# Patient Record
Sex: FEMALE | Race: BLACK OR AFRICAN AMERICAN | NOT HISPANIC OR LATINO | Employment: FULL TIME | URBAN - METROPOLITAN AREA
[De-identification: names, ages, dates, MRNs, and addresses within clinical notes are randomized per-mention and may not be internally consistent; named-entity substitution may affect disease eponyms.]

---

## 2023-08-10 LAB
CHLAMYDIA TRACH., AMPLIFIED: NEGATIVE
CLUE CELLS: PRESENT
N. GONORRHEA, AMPLIFIED: NEGATIVE
NUGENT SCORE: 9
TRICHOMONAS VAGINALIS: NEGATIVE
YEAST: ABNORMAL

## 2023-09-06 LAB — URINE CULTURE: NORMAL

## 2023-09-28 LAB
CLUE CELLS: ABNORMAL
NUGENT SCORE: 3
YEAST: PRESENT

## 2024-03-04 ENCOUNTER — HOSPITAL ENCOUNTER (EMERGENCY)
Facility: HOSPITAL | Age: 44
Discharge: HOME | End: 2024-03-04
Attending: STUDENT IN AN ORGANIZED HEALTH CARE EDUCATION/TRAINING PROGRAM
Payer: COMMERCIAL

## 2024-03-04 VITALS
WEIGHT: 165 LBS | DIASTOLIC BLOOD PRESSURE: 80 MMHG | HEIGHT: 62 IN | SYSTOLIC BLOOD PRESSURE: 124 MMHG | BODY MASS INDEX: 30.36 KG/M2 | RESPIRATION RATE: 16 BRPM | HEART RATE: 89 BPM | OXYGEN SATURATION: 100 % | TEMPERATURE: 98.3 F

## 2024-03-04 DIAGNOSIS — L02.91 ABSCESS: Primary | ICD-10-CM

## 2024-03-04 DIAGNOSIS — L73.2 HIDRADENITIS: ICD-10-CM

## 2024-03-04 PROCEDURE — 99283 EMERGENCY DEPT VISIT LOW MDM: CPT

## 2024-03-04 PROCEDURE — 99283 EMERGENCY DEPT VISIT LOW MDM: CPT | Performed by: STUDENT IN AN ORGANIZED HEALTH CARE EDUCATION/TRAINING PROGRAM

## 2024-03-04 RX ORDER — ACETAMINOPHEN 325 MG/1
650 TABLET ORAL ONCE
Status: DISCONTINUED | OUTPATIENT
Start: 2024-03-04 | End: 2024-03-04 | Stop reason: HOSPADM

## 2024-03-04 RX ORDER — ACETAMINOPHEN 500 MG
1000 TABLET ORAL EVERY 6 HOURS PRN
Qty: 30 TABLET | Refills: 0 | Status: SHIPPED | OUTPATIENT
Start: 2024-03-04 | End: 2024-03-14

## 2024-03-04 RX ORDER — CHLORHEXIDINE GLUCONATE 40 MG/ML
SOLUTION TOPICAL DAILY PRN
Qty: 473 ML | Refills: 0 | Status: SHIPPED | OUTPATIENT
Start: 2024-03-04 | End: 2024-03-04 | Stop reason: SDUPTHER

## 2024-03-04 RX ORDER — CLINDAMYCIN PHOSPHATE 11.9 MG/ML
SOLUTION TOPICAL 2 TIMES DAILY
Qty: 60 ML | Refills: 0 | Status: SHIPPED | OUTPATIENT
Start: 2024-03-04 | End: 2024-03-04 | Stop reason: SDUPTHER

## 2024-03-04 RX ORDER — CLINDAMYCIN PHOSPHATE 11.9 MG/ML
SOLUTION TOPICAL 2 TIMES DAILY
Qty: 60 ML | Refills: 0 | Status: SHIPPED | OUTPATIENT
Start: 2024-03-04 | End: 2025-03-04

## 2024-03-04 RX ORDER — CHLORHEXIDINE GLUCONATE 40 MG/ML
SOLUTION TOPICAL DAILY PRN
Qty: 473 ML | Refills: 0 | Status: SHIPPED | OUTPATIENT
Start: 2024-03-04

## 2024-03-04 RX ORDER — CLINDAMYCIN PHOSPHATE 11.9 MG/ML
SOLUTION TOPICAL ONCE
Status: DISCONTINUED | OUTPATIENT
Start: 2024-03-04 | End: 2024-03-04 | Stop reason: HOSPADM

## 2024-03-04 RX ORDER — ACETAMINOPHEN 500 MG
1000 TABLET ORAL EVERY 6 HOURS PRN
Qty: 30 TABLET | Refills: 0 | Status: SHIPPED | OUTPATIENT
Start: 2024-03-04 | End: 2024-03-04 | Stop reason: SDUPTHER

## 2024-03-04 ASSESSMENT — LIFESTYLE VARIABLES
HAVE YOU EVER FELT YOU SHOULD CUT DOWN ON YOUR DRINKING: NO
EVER FELT BAD OR GUILTY ABOUT YOUR DRINKING: NO
EVER HAD A DRINK FIRST THING IN THE MORNING TO STEADY YOUR NERVES TO GET RID OF A HANGOVER: NO
HAVE PEOPLE ANNOYED YOU BY CRITICIZING YOUR DRINKING: NO

## 2024-03-04 ASSESSMENT — PAIN - FUNCTIONAL ASSESSMENT: PAIN_FUNCTIONAL_ASSESSMENT: 0-10

## 2024-03-04 ASSESSMENT — PAIN DESCRIPTION - PAIN TYPE: TYPE: ACUTE PAIN

## 2024-03-04 ASSESSMENT — PAIN SCALES - GENERAL: PAINLEVEL_OUTOF10: 10 - WORST POSSIBLE PAIN

## 2024-03-04 ASSESSMENT — PAIN DESCRIPTION - LOCATION: LOCATION: BUTTOCKS

## 2024-03-04 ASSESSMENT — COLUMBIA-SUICIDE SEVERITY RATING SCALE - C-SSRS
6. HAVE YOU EVER DONE ANYTHING, STARTED TO DO ANYTHING, OR PREPARED TO DO ANYTHING TO END YOUR LIFE?: NO
1. IN THE PAST MONTH, HAVE YOU WISHED YOU WERE DEAD OR WISHED YOU COULD GO TO SLEEP AND NOT WAKE UP?: NO
2. HAVE YOU ACTUALLY HAD ANY THOUGHTS OF KILLING YOURSELF?: NO

## 2024-03-04 NOTE — Clinical Note
Fabiana Villanueva was seen and treated in our emergency department on 3/4/2024.  She may return to work on 03/05/2024.       If you have any questions or concerns, please don't hesitate to call.      Alfredito Fontana, DO

## 2024-03-04 NOTE — DISCHARGE INSTRUCTIONS
Please return to the ER for any worsening or persistent symptoms including high fevers, please make sure that you follow-up with general surgery and/or dermatology as needed.  You should use the topical antibiotics as prescribed.

## 2024-03-04 NOTE — ED PROVIDER NOTES
HPI: 43-year-old female with past medical history of hidradenitis suppurativa presents to the emergency department complaining of a abscess on her right medial buttock.  Patient endorses that it started yesterday, worsened pain today.  She denies any fevers, difficulty defecating, abdominal pain, nausea, vomiting or diarrhea.  States that she has had hidradenitis suppurativa flares in her buttocks, axilla bilaterally.  She has been doing warm compresses, has not taken any antibiotics or analgesia at home.    ED Triage Vitals [03/04/24 0118]   Temperature Heart Rate Respirations BP   36.8 °C (98.3 °F) 89 16 124/80      Pulse Ox Temp Source Heart Rate Source Patient Position   100 % Oral Monitor Sitting      BP Location FiO2 (%)     Right arm 21 %         Physical Examination  Vital signs reviewed in nursing triage note, on EMR flow sheets, and at bedside.  GEN: Well-developed, well nourished, in no acute distress.   SKIN: Warm, dry, intact. No gross rashes or lesions.   EYES: Pupils equal, round. EOM grossly intact. Conjunctiva clear.   HENT: Normocephalic, atraumatic, mucous membranes moist.   NECK: Supple, trachea midline.   CV: Regular rate.   PULM: Breathing nonlabored on room air, speaks in full sentences.   GI: Abd soft, nontender, nondistended.   : chaperone present.  Does have fluctuant erythematous area on right medial aspect of gluteal cleft, not near the anus.  EXT: Symmetric muscle bulk, moves all equally. No joint swelling, no pedal edema.   NEURO: Alert, CN II-XII grossly intact, no gross focal deficits. Ambulates with normal gait.   PSYCH: Answers questions appropriately with congruent affect.    MDM  43-year-old female with a past medical history of hidradenitis suppurativa presents to the emergency department complaining of abscess on her right medial buttock since yesterday.  Vital signs are stable, patient is nontoxic.  Exam is as above.  Not close to the anus, does not appear to be tracking as  such.  Patient has had at bedtime flares on her buttocks and in her axilla in the past.  We discussed importance of minimizing incision and drainages in the emergency department due to scarring and recommended outpatient follow-up with general surgery, dermatology.  She was prescribed topical clindamycin as well as Hibiclens.  We discussed return precautions, she is discharged home in stable condition.    Diagnoses as of 03/04/24 0320   Abscess   Hidradenitis        Procedures       Disposition  - Discharged    Discussed with attending physician, Dr. Marty Fontana DO  EM PGY3     Alfredito Fontana DO  Resident  03/07/24 0489

## 2024-09-03 ENCOUNTER — HOSPITAL ENCOUNTER (EMERGENCY)
Facility: HOSPITAL | Age: 44
Discharge: HOME | End: 2024-09-03
Payer: COMMERCIAL

## 2024-09-03 VITALS
HEIGHT: 62 IN | DIASTOLIC BLOOD PRESSURE: 89 MMHG | BODY MASS INDEX: 30.36 KG/M2 | TEMPERATURE: 97.5 F | OXYGEN SATURATION: 100 % | HEART RATE: 85 BPM | RESPIRATION RATE: 16 BRPM | WEIGHT: 165 LBS | SYSTOLIC BLOOD PRESSURE: 143 MMHG

## 2024-09-03 DIAGNOSIS — L73.2 HIDRADENITIS SUPPURATIVA OF LEFT AXILLA: Primary | ICD-10-CM

## 2024-09-03 LAB — PREGNANCY TEST URINE, POC: NEGATIVE

## 2024-09-03 PROCEDURE — 2500000001 HC RX 250 WO HCPCS SELF ADMINISTERED DRUGS (ALT 637 FOR MEDICARE OP): Mod: SE | Performed by: NURSE PRACTITIONER

## 2024-09-03 PROCEDURE — 99283 EMERGENCY DEPT VISIT LOW MDM: CPT | Mod: 25

## 2024-09-03 PROCEDURE — 10160 PNXR ASPIR ABSC HMTMA BULLA: CPT | Performed by: NURSE PRACTITIONER

## 2024-09-03 PROCEDURE — 99284 EMERGENCY DEPT VISIT MOD MDM: CPT | Performed by: NURSE PRACTITIONER

## 2024-09-03 PROCEDURE — 10160 PNXR ASPIR ABSC HMTMA BULLA: CPT

## 2024-09-03 PROCEDURE — 10060 I&D ABSCESS SIMPLE/SINGLE: CPT | Performed by: NURSE PRACTITIONER

## 2024-09-03 RX ORDER — CLINDAMYCIN HYDROCHLORIDE 150 MG/1
450 CAPSULE ORAL ONCE
Status: COMPLETED | OUTPATIENT
Start: 2024-09-03 | End: 2024-09-03

## 2024-09-03 RX ORDER — LIDOCAINE HYDROCHLORIDE AND EPINEPHRINE 10; 10 MG/ML; UG/ML
5 INJECTION, SOLUTION INFILTRATION; PERINEURAL ONCE
Status: DISCONTINUED | OUTPATIENT
Start: 2024-09-03 | End: 2024-09-03 | Stop reason: HOSPADM

## 2024-09-03 RX ORDER — IBUPROFEN 600 MG/1
600 TABLET ORAL EVERY 6 HOURS PRN
Qty: 25 TABLET | Refills: 0 | Status: SHIPPED | OUTPATIENT
Start: 2024-09-03

## 2024-09-03 RX ORDER — CLINDAMYCIN HYDROCHLORIDE 150 MG/1
450 CAPSULE ORAL 3 TIMES DAILY
Qty: 90 CAPSULE | Refills: 0 | Status: SHIPPED | OUTPATIENT
Start: 2024-09-03 | End: 2024-09-13

## 2024-09-03 RX ORDER — OXYCODONE HYDROCHLORIDE 5 MG/1
5 TABLET ORAL ONCE
Status: COMPLETED | OUTPATIENT
Start: 2024-09-03 | End: 2024-09-03

## 2024-09-03 ASSESSMENT — PAIN DESCRIPTION - PAIN TYPE: TYPE: ACUTE PAIN

## 2024-09-03 ASSESSMENT — COLUMBIA-SUICIDE SEVERITY RATING SCALE - C-SSRS
1. IN THE PAST MONTH, HAVE YOU WISHED YOU WERE DEAD OR WISHED YOU COULD GO TO SLEEP AND NOT WAKE UP?: NO
6. HAVE YOU EVER DONE ANYTHING, STARTED TO DO ANYTHING, OR PREPARED TO DO ANYTHING TO END YOUR LIFE?: NO
2. HAVE YOU ACTUALLY HAD ANY THOUGHTS OF KILLING YOURSELF?: NO

## 2024-09-03 ASSESSMENT — PAIN DESCRIPTION - LOCATION: LOCATION: ARM

## 2024-09-03 ASSESSMENT — PAIN SCALES - GENERAL
PAINLEVEL_OUTOF10: 10 - WORST POSSIBLE PAIN
PAINLEVEL_OUTOF10: 10 - WORST POSSIBLE PAIN

## 2024-09-03 ASSESSMENT — PAIN - FUNCTIONAL ASSESSMENT: PAIN_FUNCTIONAL_ASSESSMENT: 0-10

## 2024-09-03 ASSESSMENT — PAIN DESCRIPTION - ORIENTATION: ORIENTATION: LEFT

## 2024-09-03 NOTE — ED PROVIDER NOTES
[facility] Emergency Department  ED Provider Note      HPI   Chief Complaint   Patient presents with    Abscess     Fabiana Villanueva is a 44-year-old female with history of hidradenitis suppurativa who presents to the emergency department with complaints of an abscess to her left armpit.  Patient reports developed abscess over the last 5 days, voiced increasingly painful and painful in the last 72 hours, denies any drainage.  Currently endorses constant pressure/throbbing like pain, rates 10/10, denies take anything for her discomfort, worsens with any movement.  Patient states symptoms consistent with her typical hidradenitis abscess presentation.  Patient denies associated fever, chills, headache, lightheadedness or dizziness, extremity weakness, numbness or tingles, chest pain, shortness of breath, abdominal pain, GI or urinary symptoms.      History provided by:  Patient and spouse   used: No        Patient History   Past Medical History:   Diagnosis Date    Hidradenitis suppurativa      History reviewed. No pertinent surgical history.  No family history on file.  Social History     Tobacco Use    Smoking status: Every Day     Types: Cigars    Smokeless tobacco: Not on file   Vaping Use    Vaping status: Never Used   Substance Use Topics    Alcohol use: Never    Drug use: Never       Physical Exam   ED Triage Vitals [09/03/24 1103]   Temperature Heart Rate Respirations BP   36.4 °C (97.5 °F) 85 16 143/89      Pulse Ox Temp Source Heart Rate Source Patient Position   100 % Temporal Monitor --      BP Location FiO2 (%)     -- --       Physical Exam  VS reviewed   GEN: Nontoxic-appearing black female in no acute distress.  HEAD: Normocephalic.  Atraumatic  ENT: PERRL. EOMI. Nares patent without rhinorrhea.  MMM.  NECK: Supple. ROM intact.   CHEST: Lungs clear to throughout, bilaterally. No wheezing, rhonchi, or rales. Speaking in full sentences  HEART: RRR, Normal S1, S2.  No  murmurs/rubs/gallops.  ABD: Soft, non-surgical. No guarding, rebound tenderness or palpable mass.   EXT: Moving BUE and BLE at baseline. No clubbing, cyanosis or edema.  SKIN: Left axilla with abscess consistent with hydradenitis, diffusely tender to palpation, mild erythema and edema.  No active drainage.  NEURO: Alert, oriented, and appropriately interactive. No focal neurological deficits.   PSYCH: Calm and cooperative. Kaydenmpt .       ED Course & MDM   ED Course as of 09/03/24 1233   Tue Sep 03, 2024   1225 -- Negative urine pregnancy  [DL]      ED Course User Index  [DL] Alan CATY Veliz, APRN-CNP         Diagnoses as of 09/03/24 1233   Hidradenitis suppurativa of left axilla     No data recorded                           Medical Decision Making  Fabiana Villanueva is a 44-year-old female with history of hidradenitis suppurativa who presents to the emergency department with complaints of an abscess to her left armpit.  Reviewed prior encounter and current encounter documentation including laboratory studies, diagnostic results and interventions if present via EMR.  Patient with abscess formation of the last 5 days left underarm, states use of topical bacitracin with no significant relief.  Voiced that she was referred to general surgery but has yet to schedule appointment and would like another referral.  Reviewed vital signs, stable and afebrile.  Physical exam as documented.  Respiratory and cardiac exams unrevealing.  Hidradenitis like abscess noted to the left axilla, fluctuant and diffusely tender, mild erythema and edema.  No active drainage noted.  Ms. Villanueva is in no apparent distress at this time.  Patient medicated with Oxycodone.  See procedure note at the bottom of this document for additional documentation.  Patient to be discharged home with recommendations to complete 10-day course of Clindamycin 450 mg 3 times a day, initial dose given in the ED, Ibuprofen 600 mg every 6 hours as needed for pain, warm  compress for additional comfort measures, strict return precautions were explained.  Patient acknowledged and amenable to plan.    Amount and/or Complexity of Data Reviewed  External Data Reviewed: labs, radiology and ECG.  ECG/medicine tests: ordered.      Procedure  Incision and Drainage    Performed by: PEG Moss  Authorized by: PEG Moss    Consent:     Consent obtained:  Verbal    Consent given by:  Patient    Risks, benefits, and alternatives were discussed: yes      Risks discussed:  Bleeding, incomplete drainage and pain    Alternatives discussed:  No treatment, alternative treatment and referral  Universal protocol:     Procedure explained and questions answered to patient or proxy's satisfaction: yes      Relevant documents present and verified: yes      Test results available : yes      Imaging studies available: yes      Required blood products, implants, devices, and special equipment available: yes      Site/side marked: yes      Immediately prior to procedure, a time out was called: yes      Patient identity confirmed:  Verbally with patient and arm band  Location:     Type:  Abscess    Location: Left axilla.  Pre-procedure details:     Skin preparation:  Povidone-iodine  Sedation:     Sedation type:  None  Anesthesia:     Anesthesia method: Lidocaine.  Procedure type:     Complexity:  Simple  Procedure details:     Ultrasound guidance: no      Needle aspiration: yes      Needle size:  18 G    Drainage:  Purulent    Drainage amount: 7.5 mL.    Wound treatment:  Wound left open    Packing materials:  None  Post-procedure details:     Procedure completion:  Tolerated well, no immediate complications    Impression:  --Hydradenitis suppurativa to left axilla    Disposition:   -- Discharge  -- PCP follow-up in 1 week  -- General Surgery referral, schedule soon as possible/first available appointment    PEG Sloan III  [facility]  [place]   Emergency Medicine    Disclaimer: This note was dictated using a speech recognition program.  While an attempt was made at proof reading to minimize errors, minor errors in transcription may be present     Alan Veliz, RAQUEL-CNP  09/03/24 5685

## 2024-09-03 NOTE — DISCHARGE INSTRUCTIONS
DISCHARGE HOME PLAN:  --Complete 10-day course of clindamycin 450 mg 3 times a day  --Ibuprofen 600 mg every 6 hours as needed for pain  --Apply warm compress 3 times a day for additional comfort  --Follow-up with your primary care provider or establish with a new PCP in 1 week  -- Follow-up with general surgery as soon as possible to come up with a definitive plan, referral placed    RETURN TO THE NEAREST EMERGENCY DEPARTMENT WITH WORSENING SYMPTOMS OR NEW CONCERNS [facility]

## 2024-10-20 ENCOUNTER — HOSPITAL ENCOUNTER (EMERGENCY)
Facility: HOSPITAL | Age: 44
Discharge: HOME | End: 2024-10-20
Payer: COMMERCIAL

## 2024-10-20 PROCEDURE — 4500999001 HC ED NO CHARGE

## 2025-07-25 ENCOUNTER — APPOINTMENT (OUTPATIENT)
Facility: CLINIC | Age: 45
End: 2025-07-25
Payer: COMMERCIAL